# Patient Record
Sex: FEMALE
[De-identification: names, ages, dates, MRNs, and addresses within clinical notes are randomized per-mention and may not be internally consistent; named-entity substitution may affect disease eponyms.]

---

## 2023-01-31 ENCOUNTER — HOSPITAL ENCOUNTER (OUTPATIENT)
Dept: HOSPITAL 75 - LDRP | Age: 23
Discharge: HOME | End: 2023-01-31
Attending: FAMILY MEDICINE
Payer: COMMERCIAL

## 2023-01-31 VITALS — DIASTOLIC BLOOD PRESSURE: 77 MMHG | SYSTOLIC BLOOD PRESSURE: 130 MMHG

## 2023-01-31 VITALS — HEIGHT: 62.99 IN | WEIGHT: 216.49 LBS | BODY MASS INDEX: 38.36 KG/M2

## 2023-01-31 DIAGNOSIS — O36.8390: ICD-10-CM

## 2023-01-31 DIAGNOSIS — O24.419: Primary | ICD-10-CM

## 2023-01-31 DIAGNOSIS — Z3A.00: ICD-10-CM

## 2023-01-31 PROCEDURE — 99212 OFFICE O/P EST SF 10 MIN: CPT

## 2023-02-01 NOTE — PHYSICIAN QUERY-FINAL DX
DARLENE,JAN 2/1/23 0833:


Clinic Account Progress/Dx


Physician Query:


Please give diagnosis





Please include # weeks gestation


Date of Service





Jan 31, 2023 at 14:31





FAUSTINO POLLOCK MD 2/1/23 1938:


Clinic Account Progress/Dx


DIAGNOSIS:


Diagnosis


Gestational diabetes


Reactive NST











WHEAT,JAN Feb 1, 2023 08:33


FAUSTINO POLLOCK MD              Feb 1, 2023 19:38

## 2023-02-05 ENCOUNTER — HOSPITAL ENCOUNTER (INPATIENT)
Dept: HOSPITAL 75 - LDRP | Age: 23
LOS: 4 days | Discharge: HOME | End: 2023-02-09
Attending: FAMILY MEDICINE | Admitting: FAMILY MEDICINE
Payer: COMMERCIAL

## 2023-02-05 VITALS — BODY MASS INDEX: 38.16 KG/M2 | WEIGHT: 215.39 LBS | HEIGHT: 62.99 IN

## 2023-02-05 VITALS — SYSTOLIC BLOOD PRESSURE: 134 MMHG | DIASTOLIC BLOOD PRESSURE: 66 MMHG

## 2023-02-05 VITALS — SYSTOLIC BLOOD PRESSURE: 115 MMHG | DIASTOLIC BLOOD PRESSURE: 73 MMHG

## 2023-02-05 VITALS — DIASTOLIC BLOOD PRESSURE: 58 MMHG | SYSTOLIC BLOOD PRESSURE: 105 MMHG

## 2023-02-05 VITALS — DIASTOLIC BLOOD PRESSURE: 68 MMHG | SYSTOLIC BLOOD PRESSURE: 108 MMHG

## 2023-02-05 DIAGNOSIS — Z79.84: ICD-10-CM

## 2023-02-05 DIAGNOSIS — Z3A.39: ICD-10-CM

## 2023-02-05 LAB
BASOPHILS # BLD AUTO: 0 10^3/UL (ref 0–0.1)
BASOPHILS NFR BLD AUTO: 0 % (ref 0–10)
EOSINOPHIL # BLD AUTO: 0.1 10^3/UL (ref 0–0.3)
EOSINOPHIL NFR BLD AUTO: 1 % (ref 0–10)
HCT VFR BLD CALC: 37 % (ref 35–52)
HGB BLD-MCNC: 12.2 G/DL (ref 11.5–16)
LYMPHOCYTES # BLD AUTO: 2.1 10^3/UL (ref 1–4)
LYMPHOCYTES NFR BLD AUTO: 24 % (ref 12–44)
MANUAL DIFFERENTIAL PERFORMED BLD QL: NO
MCH RBC QN AUTO: 28 PG (ref 25–34)
MCHC RBC AUTO-ENTMCNC: 33 G/DL (ref 32–36)
MCV RBC AUTO: 84 FL (ref 80–99)
MONOCYTES # BLD AUTO: 0.7 10^3/UL (ref 0–1)
MONOCYTES NFR BLD AUTO: 8 % (ref 0–12)
NEUTROPHILS # BLD AUTO: 6 10^3/UL (ref 1.8–7.8)
NEUTROPHILS NFR BLD AUTO: 67 % (ref 42–75)
PLATELET # BLD: 318 10^3/UL (ref 130–400)
PMV BLD AUTO: 11.9 FL (ref 9–12.2)
WBC # BLD AUTO: 8.9 10^3/UL (ref 4.3–11)

## 2023-02-05 PROCEDURE — 36415 COLL VENOUS BLD VENIPUNCTURE: CPT

## 2023-02-05 PROCEDURE — 86901 BLOOD TYPING SEROLOGIC RH(D): CPT

## 2023-02-05 PROCEDURE — 85025 COMPLETE CBC W/AUTO DIFF WBC: CPT

## 2023-02-05 PROCEDURE — 86780 TREPONEMA PALLIDUM: CPT

## 2023-02-05 PROCEDURE — 86850 RBC ANTIBODY SCREEN: CPT

## 2023-02-05 PROCEDURE — 3E0DXGC INTRODUCTION OF OTHER THERAPEUTIC SUBSTANCE INTO MOUTH AND PHARYNX, EXTERNAL APPROACH: ICD-10-PCS | Performed by: FAMILY MEDICINE

## 2023-02-05 PROCEDURE — 86900 BLOOD TYPING SEROLOGIC ABO: CPT

## 2023-02-05 PROCEDURE — 82947 ASSAY GLUCOSE BLOOD QUANT: CPT

## 2023-02-05 RX ADMIN — SODIUM CHLORIDE, SODIUM LACTATE, POTASSIUM CHLORIDE, AND CALCIUM CHLORIDE SCH MLS/HR: 600; 310; 30; 20 INJECTION, SOLUTION INTRAVENOUS at 21:21

## 2023-02-05 RX ADMIN — Medication SCH ML: at 21:22

## 2023-02-06 VITALS — DIASTOLIC BLOOD PRESSURE: 74 MMHG | SYSTOLIC BLOOD PRESSURE: 124 MMHG

## 2023-02-06 VITALS — SYSTOLIC BLOOD PRESSURE: 110 MMHG | DIASTOLIC BLOOD PRESSURE: 55 MMHG

## 2023-02-06 VITALS — DIASTOLIC BLOOD PRESSURE: 71 MMHG | SYSTOLIC BLOOD PRESSURE: 114 MMHG

## 2023-02-06 VITALS — DIASTOLIC BLOOD PRESSURE: 70 MMHG | SYSTOLIC BLOOD PRESSURE: 118 MMHG

## 2023-02-06 VITALS — DIASTOLIC BLOOD PRESSURE: 85 MMHG | SYSTOLIC BLOOD PRESSURE: 141 MMHG

## 2023-02-06 VITALS — DIASTOLIC BLOOD PRESSURE: 56 MMHG | SYSTOLIC BLOOD PRESSURE: 112 MMHG

## 2023-02-06 VITALS — DIASTOLIC BLOOD PRESSURE: 62 MMHG | SYSTOLIC BLOOD PRESSURE: 114 MMHG

## 2023-02-06 VITALS — DIASTOLIC BLOOD PRESSURE: 72 MMHG | SYSTOLIC BLOOD PRESSURE: 123 MMHG

## 2023-02-06 VITALS — SYSTOLIC BLOOD PRESSURE: 124 MMHG | DIASTOLIC BLOOD PRESSURE: 71 MMHG

## 2023-02-06 VITALS — DIASTOLIC BLOOD PRESSURE: 59 MMHG | SYSTOLIC BLOOD PRESSURE: 110 MMHG

## 2023-02-06 VITALS — SYSTOLIC BLOOD PRESSURE: 124 MMHG | DIASTOLIC BLOOD PRESSURE: 74 MMHG

## 2023-02-06 VITALS — SYSTOLIC BLOOD PRESSURE: 117 MMHG | DIASTOLIC BLOOD PRESSURE: 67 MMHG

## 2023-02-06 VITALS — DIASTOLIC BLOOD PRESSURE: 67 MMHG | SYSTOLIC BLOOD PRESSURE: 124 MMHG

## 2023-02-06 VITALS — DIASTOLIC BLOOD PRESSURE: 64 MMHG | SYSTOLIC BLOOD PRESSURE: 113 MMHG

## 2023-02-06 VITALS — SYSTOLIC BLOOD PRESSURE: 111 MMHG | DIASTOLIC BLOOD PRESSURE: 64 MMHG

## 2023-02-06 VITALS — DIASTOLIC BLOOD PRESSURE: 55 MMHG | SYSTOLIC BLOOD PRESSURE: 112 MMHG

## 2023-02-06 VITALS — DIASTOLIC BLOOD PRESSURE: 56 MMHG | SYSTOLIC BLOOD PRESSURE: 110 MMHG

## 2023-02-06 VITALS — DIASTOLIC BLOOD PRESSURE: 77 MMHG | SYSTOLIC BLOOD PRESSURE: 129 MMHG

## 2023-02-06 VITALS — SYSTOLIC BLOOD PRESSURE: 121 MMHG | DIASTOLIC BLOOD PRESSURE: 69 MMHG

## 2023-02-06 VITALS — DIASTOLIC BLOOD PRESSURE: 73 MMHG | SYSTOLIC BLOOD PRESSURE: 108 MMHG

## 2023-02-06 VITALS — DIASTOLIC BLOOD PRESSURE: 61 MMHG | SYSTOLIC BLOOD PRESSURE: 109 MMHG

## 2023-02-06 VITALS — SYSTOLIC BLOOD PRESSURE: 112 MMHG | DIASTOLIC BLOOD PRESSURE: 59 MMHG

## 2023-02-06 VITALS — DIASTOLIC BLOOD PRESSURE: 72 MMHG | SYSTOLIC BLOOD PRESSURE: 118 MMHG

## 2023-02-06 VITALS — SYSTOLIC BLOOD PRESSURE: 112 MMHG | DIASTOLIC BLOOD PRESSURE: 61 MMHG

## 2023-02-06 VITALS — SYSTOLIC BLOOD PRESSURE: 120 MMHG | DIASTOLIC BLOOD PRESSURE: 67 MMHG

## 2023-02-06 VITALS — SYSTOLIC BLOOD PRESSURE: 118 MMHG | DIASTOLIC BLOOD PRESSURE: 70 MMHG

## 2023-02-06 VITALS — SYSTOLIC BLOOD PRESSURE: 133 MMHG | DIASTOLIC BLOOD PRESSURE: 53 MMHG

## 2023-02-06 VITALS — DIASTOLIC BLOOD PRESSURE: 76 MMHG | SYSTOLIC BLOOD PRESSURE: 129 MMHG

## 2023-02-06 VITALS — SYSTOLIC BLOOD PRESSURE: 126 MMHG | DIASTOLIC BLOOD PRESSURE: 78 MMHG

## 2023-02-06 VITALS — DIASTOLIC BLOOD PRESSURE: 59 MMHG | SYSTOLIC BLOOD PRESSURE: 111 MMHG

## 2023-02-06 VITALS — SYSTOLIC BLOOD PRESSURE: 135 MMHG | DIASTOLIC BLOOD PRESSURE: 70 MMHG

## 2023-02-06 VITALS — SYSTOLIC BLOOD PRESSURE: 118 MMHG | DIASTOLIC BLOOD PRESSURE: 68 MMHG

## 2023-02-06 VITALS — DIASTOLIC BLOOD PRESSURE: 58 MMHG | SYSTOLIC BLOOD PRESSURE: 123 MMHG

## 2023-02-06 VITALS — DIASTOLIC BLOOD PRESSURE: 71 MMHG | SYSTOLIC BLOOD PRESSURE: 122 MMHG

## 2023-02-06 VITALS — SYSTOLIC BLOOD PRESSURE: 129 MMHG | DIASTOLIC BLOOD PRESSURE: 72 MMHG

## 2023-02-06 VITALS — SYSTOLIC BLOOD PRESSURE: 111 MMHG | DIASTOLIC BLOOD PRESSURE: 65 MMHG

## 2023-02-06 VITALS — DIASTOLIC BLOOD PRESSURE: 84 MMHG | SYSTOLIC BLOOD PRESSURE: 130 MMHG

## 2023-02-06 VITALS — SYSTOLIC BLOOD PRESSURE: 136 MMHG | DIASTOLIC BLOOD PRESSURE: 67 MMHG

## 2023-02-06 VITALS — SYSTOLIC BLOOD PRESSURE: 115 MMHG | DIASTOLIC BLOOD PRESSURE: 64 MMHG

## 2023-02-06 VITALS — DIASTOLIC BLOOD PRESSURE: 67 MMHG | SYSTOLIC BLOOD PRESSURE: 123 MMHG

## 2023-02-06 VITALS — DIASTOLIC BLOOD PRESSURE: 82 MMHG | SYSTOLIC BLOOD PRESSURE: 131 MMHG

## 2023-02-06 VITALS — SYSTOLIC BLOOD PRESSURE: 123 MMHG | DIASTOLIC BLOOD PRESSURE: 75 MMHG

## 2023-02-06 VITALS — DIASTOLIC BLOOD PRESSURE: 72 MMHG | SYSTOLIC BLOOD PRESSURE: 132 MMHG

## 2023-02-06 VITALS — SYSTOLIC BLOOD PRESSURE: 119 MMHG | DIASTOLIC BLOOD PRESSURE: 65 MMHG

## 2023-02-06 VITALS — SYSTOLIC BLOOD PRESSURE: 129 MMHG | DIASTOLIC BLOOD PRESSURE: 74 MMHG

## 2023-02-06 VITALS — DIASTOLIC BLOOD PRESSURE: 67 MMHG | SYSTOLIC BLOOD PRESSURE: 111 MMHG

## 2023-02-06 VITALS — DIASTOLIC BLOOD PRESSURE: 71 MMHG | SYSTOLIC BLOOD PRESSURE: 131 MMHG

## 2023-02-06 VITALS — DIASTOLIC BLOOD PRESSURE: 66 MMHG | SYSTOLIC BLOOD PRESSURE: 119 MMHG

## 2023-02-06 VITALS — SYSTOLIC BLOOD PRESSURE: 122 MMHG | DIASTOLIC BLOOD PRESSURE: 75 MMHG

## 2023-02-06 VITALS — DIASTOLIC BLOOD PRESSURE: 71 MMHG | SYSTOLIC BLOOD PRESSURE: 123 MMHG

## 2023-02-06 VITALS — SYSTOLIC BLOOD PRESSURE: 114 MMHG | DIASTOLIC BLOOD PRESSURE: 65 MMHG

## 2023-02-06 VITALS — SYSTOLIC BLOOD PRESSURE: 114 MMHG | DIASTOLIC BLOOD PRESSURE: 63 MMHG

## 2023-02-06 VITALS — SYSTOLIC BLOOD PRESSURE: 125 MMHG | DIASTOLIC BLOOD PRESSURE: 57 MMHG

## 2023-02-06 VITALS — DIASTOLIC BLOOD PRESSURE: 55 MMHG | SYSTOLIC BLOOD PRESSURE: 109 MMHG

## 2023-02-06 VITALS — SYSTOLIC BLOOD PRESSURE: 130 MMHG | DIASTOLIC BLOOD PRESSURE: 79 MMHG

## 2023-02-06 VITALS — DIASTOLIC BLOOD PRESSURE: 64 MMHG | SYSTOLIC BLOOD PRESSURE: 120 MMHG

## 2023-02-06 VITALS — SYSTOLIC BLOOD PRESSURE: 105 MMHG | DIASTOLIC BLOOD PRESSURE: 57 MMHG

## 2023-02-06 VITALS — SYSTOLIC BLOOD PRESSURE: 120 MMHG | DIASTOLIC BLOOD PRESSURE: 64 MMHG

## 2023-02-06 VITALS — DIASTOLIC BLOOD PRESSURE: 65 MMHG | SYSTOLIC BLOOD PRESSURE: 111 MMHG

## 2023-02-06 VITALS — SYSTOLIC BLOOD PRESSURE: 119 MMHG | DIASTOLIC BLOOD PRESSURE: 58 MMHG

## 2023-02-06 VITALS — SYSTOLIC BLOOD PRESSURE: 140 MMHG | DIASTOLIC BLOOD PRESSURE: 90 MMHG

## 2023-02-06 VITALS — SYSTOLIC BLOOD PRESSURE: 125 MMHG | DIASTOLIC BLOOD PRESSURE: 73 MMHG

## 2023-02-06 VITALS — DIASTOLIC BLOOD PRESSURE: 55 MMHG | SYSTOLIC BLOOD PRESSURE: 101 MMHG

## 2023-02-06 VITALS — SYSTOLIC BLOOD PRESSURE: 130 MMHG | DIASTOLIC BLOOD PRESSURE: 59 MMHG

## 2023-02-06 VITALS — DIASTOLIC BLOOD PRESSURE: 63 MMHG | SYSTOLIC BLOOD PRESSURE: 119 MMHG

## 2023-02-06 VITALS — DIASTOLIC BLOOD PRESSURE: 81 MMHG | SYSTOLIC BLOOD PRESSURE: 139 MMHG

## 2023-02-06 VITALS — SYSTOLIC BLOOD PRESSURE: 122 MMHG | DIASTOLIC BLOOD PRESSURE: 76 MMHG

## 2023-02-06 VITALS — SYSTOLIC BLOOD PRESSURE: 127 MMHG | DIASTOLIC BLOOD PRESSURE: 73 MMHG

## 2023-02-06 VITALS — DIASTOLIC BLOOD PRESSURE: 75 MMHG | SYSTOLIC BLOOD PRESSURE: 121 MMHG

## 2023-02-06 VITALS — SYSTOLIC BLOOD PRESSURE: 134 MMHG | DIASTOLIC BLOOD PRESSURE: 77 MMHG

## 2023-02-06 VITALS — DIASTOLIC BLOOD PRESSURE: 58 MMHG | SYSTOLIC BLOOD PRESSURE: 109 MMHG

## 2023-02-06 VITALS — SYSTOLIC BLOOD PRESSURE: 115 MMHG | DIASTOLIC BLOOD PRESSURE: 62 MMHG

## 2023-02-06 RX ADMIN — WATER SCH MLS/HR: 1 INJECTION INTRAMUSCULAR; INTRAVENOUS; SUBCUTANEOUS at 12:07

## 2023-02-06 RX ADMIN — SODIUM CHLORIDE, SODIUM LACTATE, POTASSIUM CHLORIDE, AND CALCIUM CHLORIDE SCH MLS/HR: 600; 310; 30; 20 INJECTION, SOLUTION INTRAVENOUS at 18:25

## 2023-02-06 RX ADMIN — Medication SCH ML: at 06:02

## 2023-02-06 RX ADMIN — SODIUM CHLORIDE, SODIUM LACTATE, POTASSIUM CHLORIDE, AND CALCIUM CHLORIDE SCH MLS/HR: 600; 310; 30; 20 INJECTION, SOLUTION INTRAVENOUS at 09:26

## 2023-02-06 RX ADMIN — Medication SCH ML/HR: at 09:17

## 2023-02-06 RX ADMIN — FENTANYL CITRATE PRN MCG: 50 INJECTION, SOLUTION INTRAMUSCULAR; INTRAVENOUS at 06:53

## 2023-02-06 RX ADMIN — WATER SCH MLS/HR: 1 INJECTION INTRAMUSCULAR; INTRAVENOUS; SUBCUTANEOUS at 04:04

## 2023-02-06 RX ADMIN — WATER SCH MLS/HR: 1 INJECTION INTRAMUSCULAR; INTRAVENOUS; SUBCUTANEOUS at 21:21

## 2023-02-06 RX ADMIN — WATER SCH MLS/HR: 1 INJECTION INTRAMUSCULAR; INTRAVENOUS; SUBCUTANEOUS at 08:08

## 2023-02-06 RX ADMIN — WATER SCH MLS/HR: 1 INJECTION INTRAMUSCULAR; INTRAVENOUS; SUBCUTANEOUS at 16:41

## 2023-02-06 RX ADMIN — Medication SCH ML/HR: at 16:52

## 2023-02-06 RX ADMIN — WATER SCH MLS/HR: 1 INJECTION INTRAMUSCULAR; INTRAVENOUS; SUBCUTANEOUS at 00:14

## 2023-02-06 RX ADMIN — FENTANYL CITRATE PRN MCG: 50 INJECTION, SOLUTION INTRAMUSCULAR; INTRAVENOUS at 05:24

## 2023-02-06 RX ADMIN — SODIUM CHLORIDE, SODIUM LACTATE, POTASSIUM CHLORIDE, AND CALCIUM CHLORIDE SCH MLS/HR: 600; 310; 30; 20 INJECTION, SOLUTION INTRAVENOUS at 00:43

## 2023-02-06 RX ADMIN — Medication SCH MLS/HR: at 23:38

## 2023-02-06 NOTE — LABOR PROGRESS NOTE
Labor Progress Note


Labor Progress Note


Date Seen by Provider:  2023


Time Seen by Provider:  15:45


Subjective:


Pt feeling comfortable overall with epidural but has some pelvic pain and 

pressure.





Objective:


Last cervical exam per nursing less than 2 hours ago, reported 5-6 cm


Fetal heart tones: currently 130 beats per minute, moderate variability, 

reactive; reviewed strip, had period of minimal/nearly absent variability from 

about 6028-2549, no decels


Tocometer: 3 ctx/10 minutes; difficult to trace while side-lying





Assessment/Plan:


Tommie Hart  is a 22  /Para  1 / 0,Gestational Age (wks)39 here for 

induction of labor for GDMA2. SROM this morning around 6 am.


CEFM/TOCO


Continue pitocin


Anesthesia: epidural


Anticipate vaginal delivery.





Vitals - Labs


Vital Signs - I&O





Vital Signs








  Date Time  Temp Pulse Resp B/P (MAP) Pulse Ox O2 Delivery O2 Flow Rate FiO2


 


23 15:36  76 18 124/74 (91) 100 Non Rebreather 15.00 


 


23 15:20  80 18 120/67 (84) 100 Non Rebreather 15.00 


 


23 15:06  89 18 109/58 (75) 100 Non Rebreather 15.00 


 


23 14:51  83 18 112/59 (76) 100 Non Rebreather 15.00 


 


23 14:41      Non Rebreather 15.00 


 


23 14:35  89 18 105/57 (73) 98 Room Air  


 


23 14:20  84 18 101/55 (70) 97 Room Air  


 


23 14:09 36.2       


 


23 14:06  88 18 111/64 (80) 97 Room Air  


 


23 13:50  84 18 110/59 (76) 97 Room Air  


 


23 13:36  75 18 114/63 (80) 97 Room Air  


 


23 13:20  89 18 111/65 (80) 96 Room Air  


 


23 13:05  81 18 111/65 (80) 97 Room Air  


 


23 12:50  91 18 113/64 (80) 98 Room Air  


 


23 12:35  95 18 114/65 (81) 98 Room Air  


 


2/6/23 12:21  78 18 112/61 (78) 98 Room Air  


 


23 12:12 36.4       


 


23 12:06  93 18 114/62 (79) 98 Room Air  


 


23 11:50  94 18 115/62 (79) 98 Room Air  


 


23 11:35  92 18 115/64 (81) 96 Room Air  


 


23 11:27 36.5       


 


23 11:22  82 18 121/69 (86) 97 Room Air  


 


23 11:05  82 18 111/67 (82) 92 Room Air  


 


23 10:50  77 18 117/67 (84) 97 Room Air  


 


23 10:35  88 18 120/64 (82) 98 Room Air  


 


23 10:17  104 18 130/59 (82) 97 Room Air  


 


23 10:12 36.1 100 18 126/78 (94) 99 Room Air  


 


23 10:08  82 18 122/75 (91) 100 Room Air  


 


23 10:03  94 18 122/76 (91) 99 Room Air  


 


23 09:57  88 18 139/81 (100) 97 Room Air  


 


23 09:52  80 18 123/71 (88) 98 Room Air  


 


23 09:47  86 18 118/68 (85) 98 Room Air  


 


23 09:43  82 18 118/72 (87) 98 Room Air  


 


23 09:38  81 18 123/72 (89) 98 Room Air  


 


23 09:32  102 18 123/67 (85) 98 Room Air  


 


23 09:26  77 20 129/72 (91)  Room Air  


 


23 09:23  86 20 124/71 (88) 98 Room Air  


 


23 09:20  99 20 127/73 (91) 98 Room Air  


 


23 09:17  93 20 129/77 (94)  Room Air  


 


23 09:13  90 20 124/74 (91) 95 Room Air  


 


23 09:10  88 20 129/76 (93)  Room Air  


 


23 09:07  102 20 130/79 (96) 96 Room Air  


 


23 09:04  109 20 131/71 (91) 97 Room Air  


 


23 09:01  123 20 125/73 (90) 97 Room Air  


 


23 08:59  109 20 129/74 (92) 95 Room Air  


 


23 07:26 35.7       


 


23 06:59 36.4 91 18 118/70 (86) 100 Room Air  


 


23 06:02 36.3 91 18 118/70 (86) 100 Room Air  


 


23 05:58 36.6 74 16 118/70 (86) 100 Room Air  


 


23 02:35 36.5 72 18 114/71 (85)  Room Air  


 


23 01:00 36.5       


 


23 00:00 36.3 93 16 121/75 (90)  Room Air  


 


23 23:06 36.3 82 16 108/68 (81)    


 


23 22:00 36.5 74 18 115/73 (87)  Room Air  


 


23 21:00  76 18 105/58 (74)  Room Air  


 


23 20:00 36.4       


 


23 19:30 36.3 99 18  100 Room Air  














I & O 


 


 23





 07:00


 


Intake Total 15 ml


 


Balance 15 ml











Labs


Laboratory Tests


23 19:35: 


White Blood Count 8.9, Red Blood Count 4.37, Hemoglobin 12.2, Hematocrit 37, 

Mean Corpuscular Volume 84, Mean Corpuscular Hemoglobin 28, Mean Corpuscular 

Hemoglobin Concent 33, Red Cell Distribution Width 15.0H, Platelet Count 318, 

Mean Platelet Volume 11.9, Immature Granulocyte % (Auto) 0, Neutrophils (%) 

(Auto) 67, Lymphocytes (%) (Auto) 24, Monocytes (%) (Auto) 8, Eosinophils (%) 

(Auto) 1, Basophils (%) (Auto) 0, Neutrophils # (Auto) 6.0, Lymphocytes # (Auto)

2.1, Monocytes # (Auto) 0.7, Eosinophils # (Auto) 0.1, Basophils # (Auto) 0.0, 

Immature Granulocyte # (Auto) 0.0, Glucose Level 77


23 00:13: Glucometer 67L


23 04:07: Glucometer 79


23 08:13: Glucometer 102


23 12:10: Glucometer 103











FAUSTINO POLLOCK MD              2023 15:58

## 2023-02-06 NOTE — LABOR PROGRESS NOTE
Labor Progress Note


Labor Progress Note


Date Seen by Provider:  2023


Time Seen by Provider:  16:00


Subjective:


Fetal heart rate tracing lost, per bedside nurse, able to hear heart tones in 

the 80s, called to bedside, and heart tones 100-110 at that time.





Objective:


Cervical exam: /+1


Consistency: soft


Position: anterior


Presentation: vertex





Assessment/Plan:


Tommie Hart  is a (22  /Para  1 / 0,Gestational Age (wks)39 here for 

induction of labor due to GDMA2. FSE placed and heart tones in 130s with 

moderate variability. Some cervical change from last check. Discussed with 

patient and  if recurrent prolonged decelerations may need emergent 

delivery, but current heart tones are reassuring, will monitor closely.


FSE/TOCO


Continue pitocin


Anesthesia: Epidural


Anticipate vaginal delivery.





Vitals - Labs


Vital Signs - I&O





Vital Signs








  Date Time  Temp Pulse Resp B/P (MAP) Pulse Ox O2 Delivery O2 Flow Rate FiO2


 


23 15:36  76 18 124/74 (91) 100 Non Rebreather 15.00 


 


23 15:20  80 18 120/67 (84) 100 Non Rebreather 15.00 


 


23 15:06  89 18 109/58 (75) 100 Non Rebreather 15.00 


 


23 14:51  83 18 112/59 (76) 100 Non Rebreather 15.00 


 


23 14:41      Non Rebreather 15.00 


 


23 14:35  89 18 105/57 (73) 98 Room Air  


 


23 14:20  84 18 101/55 (70) 97 Room Air  


 


23 14:09 36.2       


 


23 14:06  88 18 111/64 (80) 97 Room Air  


 


23 13:50  84 18 110/59 (76) 97 Room Air  


 


23 13:36  75 18 114/63 (80) 97 Room Air  


 


23 13:20  89 18 111/65 (80) 96 Room Air  


 


23 13:05  81 18 111/65 (80) 97 Room Air  


 


23 12:50  91 18 113/64 (80) 98 Room Air  


 


23 12:35  95 18 114/65 (81) 98 Room Air  


 


23 12:21  78 18 112/61 (78) 98 Room Air  


 


23 12:12 36.4       


 


23 12:06  93 18 114/62 (79) 98 Room Air  


 


23 11:50  94 18 115/62 (79) 98 Room Air  


 


23 11:35  92 18 115/64 (81) 96 Room Air  


 


23 11:27 36.5       


 


23 11:22  82 18 121/69 (86) 97 Room Air  


 


23 11:05  82 18 111/67 (82) 92 Room Air  


 


23 10:50  77 18 117/67 (84) 97 Room Air  


 


23 10:35  88 18 120/64 (82) 98 Room Air  


 


23 10:17  104 18 130/59 (82) 97 Room Air  


 


23 10:12 36.1 100 18 126/78 (94) 99 Room Air  


 


23 10:08  82 18 122/75 (91) 100 Room Air  


 


23 10:03  94 18 122/76 (91) 99 Room Air  


 


23 09:57  88 18 139/81 (100) 97 Room Air  


 


23 09:52  80 18 123/71 (88) 98 Room Air  


 


23 09:47  86 18 118/68 (85) 98 Room Air  


 


23 09:43  82 18 118/72 (87) 98 Room Air  


 


23 09:38  81 18 123/72 (89) 98 Room Air  


 


23 09:32  102 18 123/67 (85) 98 Room Air  


 


23 09:26  77 20 129/72 (91)  Room Air  


 


23 09:23  86 20 124/71 (88) 98 Room Air  


 


23 09:20  99 20 127/73 (91) 98 Room Air  


 


23 09:17  93 20 129/77 (94)  Room Air  


 


23 09:13  90 20 124/74 (91) 95 Room Air  


 


23 09:10  88 20 129/76 (93)  Room Air  


 


23 09:07  102 20 130/79 (96) 96 Room Air  


 


23 09:04  109 20 131/71 (91) 97 Room Air  


 


23 09:01  123 20 125/73 (90) 97 Room Air  


 


23 08:59  109 20 129/74 (92) 95 Room Air  


 


23 07:26 35.7       


 


23 06:59 36.4 91 18 118/70 (86) 100 Room Air  


 


23 06:02 36.3 91 18 118/70 (86) 100 Room Air  


 


23 05:58 36.6 74 16 118/70 (86) 100 Room Air  


 


23 02:35 36.5 72 18 114/71 (85)  Room Air  


 


23 01:00 36.5       


 


23 00:00 36.3 93 16 121/75 (90)  Room Air  


 


23 23:06 36.3 82 16 108/68 (81)    


 


23 22:00 36.5 74 18 115/73 (87)  Room Air  


 


23 21:00  76 18 105/58 (74)  Room Air  


 


23 20:00 36.4       


 


23 19:30 36.3 99 18  100 Room Air  














I & O 


 


 23





 07:00


 


Intake Total 15 ml


 


Balance 15 ml











Labs


Laboratory Tests


23 19:35: 


White Blood Count 8.9, Red Blood Count 4.37, Hemoglobin 12.2, Hematocrit 37, Me

an Corpuscular Volume 84, Mean Corpuscular Hemoglobin 28, Mean Corpuscular 

Hemoglobin Concent 33, Red Cell Distribution Width 15.0H, Platelet Count 318, 

Mean Platelet Volume 11.9, Immature Granulocyte % (Auto) 0, Neutrophils (%) 

(Auto) 67, Lymphocytes (%) (Auto) 24, Monocytes (%) (Auto) 8, Eosinophils (%) 

(Auto) 1, Basophils (%) (Auto) 0, Neutrophils # (Auto) 6.0, Lymphocytes # (Auto)

2.1, Monocytes # (Auto) 0.7, Eosinophils # (Auto) 0.1, Basophils # (Auto) 0.0, 

Immature Granulocyte # (Auto) 0.0, Glucose Level 77


23 00:13: Glucometer 67L


23 04:07: Glucometer 79


23 08:13: Glucometer 102


23 12:10: Glucometer 103











FAUSTINO POLLOCK MD              2023 16:19

## 2023-02-07 VITALS — DIASTOLIC BLOOD PRESSURE: 65 MMHG | SYSTOLIC BLOOD PRESSURE: 123 MMHG

## 2023-02-07 VITALS — DIASTOLIC BLOOD PRESSURE: 64 MMHG | SYSTOLIC BLOOD PRESSURE: 124 MMHG

## 2023-02-07 VITALS — DIASTOLIC BLOOD PRESSURE: 69 MMHG | SYSTOLIC BLOOD PRESSURE: 125 MMHG

## 2023-02-07 VITALS — SYSTOLIC BLOOD PRESSURE: 109 MMHG | DIASTOLIC BLOOD PRESSURE: 70 MMHG

## 2023-02-07 VITALS — DIASTOLIC BLOOD PRESSURE: 57 MMHG | SYSTOLIC BLOOD PRESSURE: 116 MMHG

## 2023-02-07 VITALS — DIASTOLIC BLOOD PRESSURE: 63 MMHG | SYSTOLIC BLOOD PRESSURE: 127 MMHG

## 2023-02-07 VITALS — SYSTOLIC BLOOD PRESSURE: 130 MMHG | DIASTOLIC BLOOD PRESSURE: 69 MMHG

## 2023-02-07 VITALS — DIASTOLIC BLOOD PRESSURE: 56 MMHG | SYSTOLIC BLOOD PRESSURE: 106 MMHG

## 2023-02-07 VITALS — SYSTOLIC BLOOD PRESSURE: 112 MMHG | DIASTOLIC BLOOD PRESSURE: 60 MMHG

## 2023-02-07 VITALS — SYSTOLIC BLOOD PRESSURE: 114 MMHG | DIASTOLIC BLOOD PRESSURE: 67 MMHG

## 2023-02-07 VITALS — DIASTOLIC BLOOD PRESSURE: 69 MMHG | SYSTOLIC BLOOD PRESSURE: 130 MMHG

## 2023-02-07 PROCEDURE — 0DQR0ZZ REPAIR ANAL SPHINCTER, OPEN APPROACH: ICD-10-PCS | Performed by: FAMILY MEDICINE

## 2023-02-07 RX ADMIN — Medication SCH MLS/HR: at 00:02

## 2023-02-07 RX ADMIN — IBUPROFEN SCH MG: 600 TABLET ORAL at 08:40

## 2023-02-07 RX ADMIN — IBUPROFEN SCH MG: 600 TABLET ORAL at 02:34

## 2023-02-07 RX ADMIN — DOCUSATE SODIUM SCH MG: 100 CAPSULE ORAL at 08:40

## 2023-02-07 RX ADMIN — DOCUSATE SODIUM SCH MG: 100 CAPSULE ORAL at 21:05

## 2023-02-07 RX ADMIN — IBUPROFEN SCH MG: 600 TABLET ORAL at 21:05

## 2023-02-07 RX ADMIN — IBUPROFEN SCH MG: 600 TABLET ORAL at 15:36

## 2023-02-07 NOTE — ANESTHESIA-REGIONAL POST-OP
Regional


Patient Condition


Mental Status:  Alert, Oriented x3


Circulation:  Same as Pre-Op


Headache:  Absent


Sensation:  Full Recovery


Motor Block:  Absent





Post Op Complications


Complications


None





Follow Up Care/Instructions


Patient Instructions


None needed.





Anesthesia/Patient Condition


Patient is doing well, no complaints, stable vital signs, no apparent adverse 

anesthesia problems.   


No complications reported per nursing.


[place] [facility] [place]  [place]











NEIL HILARIO CRNA            Feb 7, 2023 10:12

## 2023-02-07 NOTE — PROGRESS NOTE
Subjective


Subjective/Events-last exam


Doing well.  Bleeding slowed.





Objective


Exam


Last Set of Vital Signs





Vital Signs








  Date Time  Temp Pulse Resp B/P (MAP) Pulse Ox O2 Delivery O2 Flow Rate FiO2


 


23 16:57 36.4 85 18 109/70 (83) 98 Room Air  


 


23 23:20       15.00 





Capillary Refill : Less Than 3 Seconds


I&O











Intake and Output 


 


 23





 00:00


 


Intake Total 1050 ml


 


Balance 1050 ml


 


 


 


Intake IV Total 1050 ml


 


Blood Loss Quantification Method 200 ml








General:  Alert, Oriented X3, Cooperative


Psych/Mental Status:  Mental Status NL, Mood NL





Results/Procedures


Lab


Laboratory Tests


23 20:42: Glucometer 94


23 21:28: Glucometer 94


23 23:26: Glucometer 132H





Assessment/Plan


Assessment/Plan


Assessment & Plan


PPD#1, s/p  on 23 @39wk


IOL secondary to GDMA2 controlled with metformin


3rd degree laceration





Routine postpartum care


Stool softeners











JOSE SALVADOR DO                 2023 18:48

## 2023-02-07 NOTE — OB LABOR & DELIVERY RECORD
Vag Delivery Note


Vag Delivery Note


Date of Delivery: 23 





Preoperative Diagnosis: Tommie Hart  is a (22  /Para  1 / 0,Gestational

Age (wks)39with 1 day





Postoperative Diagnosis: Same


Surgeon: FAUSTINO POLLOCK 





Anesthesia: Epidural





Delivery Type: 





Findings: 


Viable female infant, apgars 9/9, weight 8#13


Lacerations: third degree perineal, right periurethral


Intact placenta with 3 vessel cord. No nuchal cord, body cord or shoulder 

dystocia





Estimated Blood Loss: 200 ml





Complications: None





Condition: Stable





Description of Procedure:





The patient is a 22 year old female who presented for induction of labor due to 

GDMA2. She was admitted and informed consent was obtained. Her labor course was 

remarkable for prolonged active phase and second stage. She progressed to 

complete dilatation and began to push. 





She was then set up for delivery. The infant's head was delivered atraumatically

in the MARIA ANTONIA position. The shoulders and remainder of the infant's body were then 

delivered without difficulty. Upon delivery, the infant was vigorous and placed 

on maternal chest and the mouth and nares were bulb suctioned. After a delay 

cord was doubly clamped and cut and the infant remained on maternal chest. An 

intact placenta with 3-vessel cord delivered via Guerita and there was found to 

be minimal bleeding.~ Vigorous fundal massage was performed and the fundus was 

found to be firm. IV oxytocin was given. Examination of the vagina and perineum 

revealed a third degree perineal laceration repaired in the usual fashion with 

2-0 vicryl for the muscle layer and 3-0 vicryl rapide suture for the second 

degree section. Following the repair, sponge, instrument and needle counts were 

correct. Mom and baby were both in stable condition in the labor suite.





Vitals - Labs


Vital Signs - I&O





Vital Signs








  Date Time  Temp Pulse Resp B/P (MAP) Pulse Ox O2 Delivery O2 Flow Rate FiO2


 


23 18:50 37.1 97 18 119/63 (81) 98 Room Air  


 


23 18:36  98 18 109/55 (73) 99 Room Air  


 


23 18:20  96 18 112/56 (74) 98 Room Air  


 


23 18:07  96 18 119/66 (83) 98 Room Air  


 


23 17:50  94 18 109/61 (77) 98 Room Air  


 


23 17:35  96 18 111/59 (76) 99 Room Air  


 


23 17:20  104 18 110/55 (73) 98 Room Air  


 


23 17:05  104 18 112/55 (74) 98 Room Air  


 


23 16:50  90 18 110/59 (76) 99 Room Air  


 


23 16:35  88 18 131/82 (98) 100 Non Rebreather 15.00 


 


23 16:23  85 18 134/77 (96) 100 Non Rebreather 15.00 


 


23 16:05  92 18 130/84 (99) 100 Non Rebreather 15.00 


 


23 15:50  76 18 123/72 (89) 100 Non Rebreather 15.00 


 


23 15:36  76 18 124/74 (91) 100 Non Rebreather 15.00 


 


23 15:20  80 18 120/67 (84) 100 Non Rebreather 15.00 


 


23 15:06  89 18 109/58 (75) 100 Non Rebreather 15.00 


 


23 14:51  83 18 112/59 (76) 100 Non Rebreather 15.00 


 


23 14:41      Non Rebreather 15.00 


 


23 14:35  89 18 105/57 (73) 98 Room Air  


 


23 14:20  84 18 101/55 (70) 97 Room Air  


 


23 14:09 36.2       


 


23 14:06  88 18 111/64 (80) 97 Room Air  


 


23 13:50  84 18 110/59 (76) 97 Room Air  


 


23 13:36  75 18 114/63 (80) 97 Room Air  


 


23 13:20  89 18 111/65 (80) 96 Room Air  


 


23 13:05  81 18 111/65 (80) 97 Room Air  


 


23 12:50  91 18 113/64 (80) 98 Room Air  


 


23 12:35  95 18 114/65 (81) 98 Room Air  


 


23 12:21  78 18 112/61 (78) 98 Room Air  


 


23 12:12 36.4       


 


23 12:06  93 18 114/62 (79) 98 Room Air  


 


23 11:50  94 18 115/62 (79) 98 Room Air  


 


23 11:35  92 18 115/64 (81) 96 Room Air  


 


23 11:27 36.5       


 


23 11:22  82 18 121/69 (86) 97 Room Air  


 


23 11:05  82 18 111/67 (82) 92 Room Air  


 


23 10:50  77 18 117/67 (84) 97 Room Air  


 


23 10:35  88 18 120/64 (82) 98 Room Air  


 


23 10:17  104 18 130/59 (82) 97 Room Air  


 


23 10:12 36.1 100 18 126/78 (94) 99 Room Air  


 


23 10:08  82 18 122/75 (91) 100 Room Air  


 


23 10:03  94 18 122/76 (91) 99 Room Air  


 


23 09:57  88 18 139/81 (100) 97 Room Air  


 


23 09:52  80 18 123/71 (88) 98 Room Air  


 


23 09:47  86 18 118/68 (85) 98 Room Air  


 


23 09:43  82 18 118/72 (87) 98 Room Air  


 


23 09:38  81 18 123/72 (89) 98 Room Air  


 


23 09:32  102 18 123/67 (85) 98 Room Air  


 


23 09:26  77 20 129/72 (91)  Room Air  


 


23 09:23  86 20 124/71 (88) 98 Room Air  


 


23 09:20  99 20 127/73 (91) 98 Room Air  


 


23 09:17  93 20 129/77 (94)  Room Air  


 


23 09:13  90 20 124/74 (91) 95 Room Air  


 


23 09:10  88 20 129/76 (93)  Room Air  


 


23 09:07  102 20 130/79 (96) 96 Room Air  


 


23 09:04  109 20 131/71 (91) 97 Room Air  


 


23 09:01  123 20 125/73 (90) 97 Room Air  


 


23 08:59  109 20 129/74 (92) 95 Room Air  


 


23 07:26 35.7       


 


23 06:59 36.4 91 18 118/70 (86) 100 Room Air  


 


23 06:02 36.3 91 18 118/70 (86) 100 Room Air  


 


23 05:58 36.6 74 16 118/70 (86) 100 Room Air  


 


23 02:35 36.5 72 18 114/71 (85)  Room Air  


 


23 01:00 36.5       














I & O 


 


 23





 07:00


 


Intake Total 1000 ml


 


Balance 1000 ml











Labs


Laboratory Tests


23 04:07: Glucometer 79


23 08:13: Glucometer 102


23 12:10: Glucometer 103


23 16:45: Glucometer 71


23 20:42: Glucometer 94


23 21:28: Glucometer 94


23 23:26: Glucometer 132H











FAUSTINO POLLOCK MD              2023 00:20

## 2023-02-08 VITALS — SYSTOLIC BLOOD PRESSURE: 136 MMHG | DIASTOLIC BLOOD PRESSURE: 81 MMHG

## 2023-02-08 VITALS — SYSTOLIC BLOOD PRESSURE: 124 MMHG | DIASTOLIC BLOOD PRESSURE: 76 MMHG

## 2023-02-08 VITALS — SYSTOLIC BLOOD PRESSURE: 116 MMHG | DIASTOLIC BLOOD PRESSURE: 67 MMHG

## 2023-02-08 VITALS — SYSTOLIC BLOOD PRESSURE: 113 MMHG | DIASTOLIC BLOOD PRESSURE: 66 MMHG

## 2023-02-08 LAB
BASOPHILS # BLD AUTO: 0 10^3/UL (ref 0–0.1)
BASOPHILS NFR BLD AUTO: 0 % (ref 0–10)
EOSINOPHIL # BLD AUTO: 0.1 10^3/UL (ref 0–0.3)
EOSINOPHIL NFR BLD AUTO: 1 % (ref 0–10)
HCT VFR BLD CALC: 31 % (ref 35–52)
HGB BLD-MCNC: 9.8 G/DL (ref 11.5–16)
LYMPHOCYTES # BLD AUTO: 2.4 10^3/UL (ref 1–4)
LYMPHOCYTES NFR BLD AUTO: 19 % (ref 12–44)
MANUAL DIFFERENTIAL PERFORMED BLD QL: NO
MCH RBC QN AUTO: 28 PG (ref 25–34)
MCHC RBC AUTO-ENTMCNC: 32 G/DL (ref 32–36)
MCV RBC AUTO: 88 FL (ref 80–99)
MONOCYTES # BLD AUTO: 0.9 10^3/UL (ref 0–1)
MONOCYTES NFR BLD AUTO: 7 % (ref 0–12)
NEUTROPHILS # BLD AUTO: 9.1 10^3/UL (ref 1.8–7.8)
NEUTROPHILS NFR BLD AUTO: 72 % (ref 42–75)
PLATELET # BLD: 229 10^3/UL (ref 130–400)
PMV BLD AUTO: 11.6 FL (ref 9–12.2)
WBC # BLD AUTO: 12.6 10^3/UL (ref 4.3–11)

## 2023-02-08 RX ADMIN — IBUPROFEN SCH MG: 600 TABLET ORAL at 14:15

## 2023-02-08 RX ADMIN — IBUPROFEN SCH MG: 600 TABLET ORAL at 09:05

## 2023-02-08 RX ADMIN — DOCUSATE SODIUM SCH MG: 100 CAPSULE ORAL at 09:06

## 2023-02-08 RX ADMIN — IBUPROFEN SCH MG: 600 TABLET ORAL at 02:49

## 2023-02-08 RX ADMIN — IBUPROFEN SCH MG: 600 TABLET ORAL at 21:34

## 2023-02-08 RX ADMIN — DOCUSATE SODIUM SCH MG: 100 CAPSULE ORAL at 21:34

## 2023-02-08 NOTE — PROGRESS NOTE
Subjective


Subjective/Events-last exam


Doing well.  Infant blood sugars have been running low. Breast feeding has been 

going well.





Objective


Exam


Last Set of Vital Signs





Vital Signs








  Date Time  Temp Pulse Resp B/P (MAP) Pulse Ox O2 Delivery O2 Flow Rate FiO2


 


23 09:00 36.4 77 18 116/67 (83) 97 Room Air  


 


23 23:20       15.00 





Capillary Refill : Less Than 3 Seconds


I&O











Intake and Output 


 


 23





 00:00


 


Intake Total 1900 ml


 


Balance 1900 ml


 


 


 


Intake IV Total 1900 ml


 


Blood Loss Quantification Method 200 ml








General:  Alert, Oriented X3, Cooperative


Psych/Mental Status:  Mental Status NL, Mood NL





Results/Procedures


Lab


Laboratory Tests


23 05:33: 


White Blood Count 12.6H, Red Blood Count 3.51L, Hemoglobin 9.8L, Hematocrit 31L,

Mean Corpuscular Volume 88, Mean Corpuscular Hemoglobin 28, Mean Corpuscular 

Hemoglobin Concent 32, Red Cell Distribution Width 15.9H, Platelet Count 229, 

Mean Platelet Volume 11.6, Immature Granulocyte % (Auto) 1, Neutrophils (%) 

(Auto) 72, Lymphocytes (%) (Auto) 19, Monocytes (%) (Auto) 7, Eosinophils (%) 

(Auto) 1, Basophils (%) (Auto) 0, Neutrophils # (Auto) 9.1H, Lymphocytes # 

(Auto) 2.4, Monocytes # (Auto) 0.9, Eosinophils # (Auto) 0.1, Basophils # (Auto)

0.0, Immature Granulocyte # (Auto) 0.1





Assessment/Plan


Assessment/Plan


Assessment & Plan


PPD#2, s/p  on 23 @39wk


IOL secondary to GDMA2 controlled with metformin


3rd degree laceration





Routine postpartum care


Stool softeners





Delaying infant discharge due to hypoglycemia.











JOSE SALVADOR DO                 2023 11:30

## 2023-02-09 VITALS — SYSTOLIC BLOOD PRESSURE: 121 MMHG | DIASTOLIC BLOOD PRESSURE: 76 MMHG

## 2023-02-09 VITALS — DIASTOLIC BLOOD PRESSURE: 77 MMHG | SYSTOLIC BLOOD PRESSURE: 119 MMHG

## 2023-02-09 RX ADMIN — DOCUSATE SODIUM SCH MG: 100 CAPSULE ORAL at 08:05

## 2023-02-09 RX ADMIN — IBUPROFEN SCH MG: 600 TABLET ORAL at 05:27

## 2023-02-09 RX ADMIN — IBUPROFEN SCH MG: 600 TABLET ORAL at 12:06

## 2023-05-31 NOTE — HISTORY & PHYSICAL-OB
OB - Chief Complaint & HPI


Date/Time


Date of Admission:


Date of Admission:  2023 at 19:17


Date seen by a Provider:  2023


Time Seen by a Provider:  08:00





Chief Complaint/History


OB-Reason for Admission/Chief:  Induction of Labor


Hx :  1


Hx Para:  0


Expected Date of Delivery:  2023


Gestational Age in Weeks:  39


Gestational Age in Days:  0


Indication for induction:  medical complication


Other reason for admission:


G1 at 39w1d admitted for induction of labor due to well controlled GDMA2 on 

metformin. Followed with weekly BPPs which have been reassuring, EFW on  was

3540 grams.


History of Labs


O+, antibody neg, RI. HIV/hepB/RPR NR. GC/chlamydia neg. GBS pos.





Allergies and Home Medications


Allergies


Coded Allergies:  


     acetaminophen (Verified  Allergy, Severe, Anaphylaxis, 23)


     dextromethorphan (Verified  Allergy, Severe, Anaphylaxis, 23)


     doxylamine (Verified  Allergy, Severe, Anaphylaxis, 23)


     pseudoephedrine (Verified  Allergy, Severe, Anaphylaxis, 23)





Patient Home Medication List


Home Medication List Reviewed:  Yes


Metformin HCl (Metformin HCl) 500 Mg/5 Ml Solution, 500 MG PO BID, (Reported)


   Entered as Reported by: SHANNAN TOWNSEND on 23


   Last Action: Last Taken Edited


Prenatal Vit/Iron Fumarate/FA (Prenatal Tablet) 27 Mg Iron-800 Mcg Tablet, 1 

EACH PO, (Reported)


   Entered as Reported by: SHANNAN TOWNSEND on 23


   Last Action: Last Taken Edited





OB - History


Hx of Present Pregnancy


Ultrasounds:  Normal mid trimester US


Obstetrical Complications:  Gestational Diabetes





Prenatal Information


Pregnancy Induced Hypertension:  No


Maternal Gestational Diabetes:  Yes


Postpartum Hemorrhage:  No





Obstetrical History


Hx :  1


Hx Para:  0





Patient Past Medical History





PMHx:


denies





surghx:


Denies





Social History/Family History


Alcohol Use:  Denies Use


Recreational Drug Use:  No


Smoking Cessation:  Never smoker


2nd Hand Smoke Exposure:  No





Immunizations


Influenza Vaccine Up-to-Date:  Yes; Up-to-Date (22)


COVID19 Booster (Date):  22


COVID19 Vaccine :  Pfizer Bivalent


Tetanus Booster (TDap):  Less than 5yrs (22)


Rubella:  immune


RPR/VDRL:  Negative


GBS Status:  Positive


HBsAG:  Negative





OB - Admission Exam


Physical Exam


Vitals:





Vital Signs








 23





 06:59


 


Temp 36.4


 


Pulse 91


 


Resp 18


 


B/P (MAP) 118/70 (86)


 


Pulse Ox 100


 


O2 Delivery Room Air








HEENT:  NCAT


Abdomen:  Non tender


Extremities:  Normal


Cervical Dilatation:  4cm


Effacement:  100%


Station:  0


Membranes:  Ruptured


Accelerations:  Accelerations Present


Decelerations:  No Decelerations


Short Term Variability:  Present


Long Term Variability:  Average (6-25)


Contractions on Admission:  None





Zheng Scoring Tool (Modified)


Dilation (cm):  1-2cm (1)


Effacement (%):  0-30%    (0)


Fetal Descent/Station:  -3        (0)


Cervix Consistency:  Soft      (2)


Cervix Position:  Middle/Mid-Position  (1)


Subtract 1 point for:  Nulliparity (-1)


Zheng Score:  3


Labs





Laboratory Tests








Test


 23


19:35 23


00:13 23


04:07 Range/Units


 


 


White Blood Count


 8.9 


 


 


 4.3-11.0


10^3/uL


 


Red Blood Count


 4.37 


 


 


 3.80-5.11


10^6/uL


 


Hemoglobin 12.2    11.5-16.0  g/dL


 


Hematocrit 37    35-52  %


 


Mean Corpuscular Volume 84    80-99  fL


 


Mean Corpuscular Hemoglobin 28    25-34  pg


 


Mean Corpuscular Hemoglobin


Concent 33 


 


 


 32-36  g/dL





 


Red Cell Distribution Width 15.0 H   10.0-14.5  %


 


Platelet Count


 318 


 


 


 130-400


10^3/uL


 


Mean Platelet Volume 11.9    9.0-12.2  fL


 


Immature Granulocyte % (Auto) 0     %


 


Neutrophils (%) (Auto) 67    42-75  %


 


Lymphocytes (%) (Auto) 24    12-44  %


 


Monocytes (%) (Auto) 8    0-12  %


 


Eosinophils (%) (Auto) 1    0-10  %


 


Basophils (%) (Auto) 0    0-10  %


 


Neutrophils # (Auto)


 6.0 


 


 


 1.8-7.8


10^3/uL


 


Lymphocytes # (Auto)


 2.1 


 


 


 1.0-4.0


10^3/uL


 


Monocytes # (Auto)


 0.7 


 


 


 0.0-1.0


10^3/uL


 


Eosinophils # (Auto)


 0.1 


 


 


 0.0-0.3


10^3/uL


 


Basophils # (Auto)


 0.0 


 


 


 0.0-0.1


10^3/uL


 


Immature Granulocyte # (Auto)


 0.0 


 


 


 0.0-0.1


10^3/uL


 


Glucose Level 77      MG/DL


 


Glucometer  67 L 79    MG/DL











OB - Assessment/Plan/Diagnosis


Assessment


Admission Dx


Term intrauterine pregnancy at 39 weeks gestation


Gestational diabetes controlled with metformin


GBS positive


Admission Status:  Inpatient Order (span 2 midnights)


Reason for Inpatient Admission:  


Induction, labor, postpartum course





Plan


Plan:  Induction


Induction Method:  per Misoprostol Protocol


Other Plan


Ampicillin for GBS pos


Fingerstick glucose q4











FAUSTINO POLLOCK MD              2023 08:40 Scheduled procedure with Caregiver, [place]  () at      Telephone Information:   Mobile [phone]      Scheduled Via: Case Request Order for  [facility]  Before selecting Facility was BMI double checked Yes  Did patient request a different provider then the referred to:No  Procedure date: 9/14/2023   Procedure time: TBD ; Did patient request this specific time? No  -If a MAC pt is scheduled, pt was notified a family member/friend is need to stay with the patient over night after their procedure: Yes            Notified patient about receiving an animated [place] program? Yes    Was letter mailed No  Was the letter sent thru [employer]? Yes. If yes was the patient to to look under letters for prep instructions?  Yes      The following have been confirmed:  Was patients demographics verified, (address,phone number email) YES  Insurance name confirmed as [employer], will be the same at time of procedure?: Yes Ins Accepted at Facility? Yes  Latex Allergy No  Diabetic No  Sleep Apnea No if yes CPAP  No  Diuretic/Water pill Yes  Defibrillator/Pacemaker No  MRSA hx No  On Blood thinners and told to hold : Coumadin (Warfarin) or Plavix No   Phentermine (diet pill) No      Prep required? Yes, Pharmacy is N/A ; was request sent to nurse to send prep to pharmacy? No; Briefly reviewed? Yes;   If procedure is scheduled 7 days or less, patient was told to  prep letter?: n/a